# Patient Record
(demographics unavailable — no encounter records)

---

## 2025-05-02 NOTE — HISTORY OF PRESENT ILLNESS
[FreeTextEntry1] : MICK [de-identified] : The patient is a 76 year old F who presents to the office for an annual wellness examination and follow up of chronic medical conditions. She reports compliance with all prescribed medical therapy and dietary restrictions.  Routine Health maintenance (as applicable) Mammogram: 5/2024 Pap Smear: DEXA (65+): 2021; osteopenia  Colonoscopy (45+): 2023   COVID vaccine series: Flu: 2024 Tdap (19-64): 2015 Shingles (50+, immunocompetent): 2009 PCV (>66yo/other conditions): 2020

## 2025-05-02 NOTE — COUNSELING
[Fall prevention counseling provided] : Fall prevention counseling provided [Yes] : Risk of tobacco use and health benefits of smoking cessation discussed: Yes [No] : Not willing to quit smoking [AUDIT-C Screening administered and reviewed] : AUDIT-C Screening administered and reviewed [Potential consequences of obesity discussed] : Potential consequences of obesity discussed [Benefits of weight loss discussed] : Benefits of weight loss discussed [Encouraged to increase physical activity] : Encouraged to increase physical activity [Cessation strategies including cessation program discussed] : Cessation strategies including cessation program discussed [Quit Drinking] : Quit Drinking [Good understanding] : Patient has a good understanding of lifestyle changes and steps needed to achieve self management goal

## 2025-05-02 NOTE — ASSESSMENT
[FreeTextEntry1] : The patient is a 76 year old F who presents to the office for an annual wellness examination - Fasting labs to screen for anemia, electrolyte disturbances, DM, lipid disorders, and additional metabolic disorders drawn in office - EKG reviewed - NSR - PHQ2 performed: negative - Age-appropriate Immunizations recommended  - Encouraged routine dental, vision, dermatology screenings & age-appropriate physical activity - Colonoscopy screening: UTD - Mammogram: now due  - Pap smear: UTD - DEXA: now due     Where applicable: - Labs drawn in office. - Appropriate medication renewal(s) provided. - Provided scripts for necessary imaging and/or referrals.     Further management to be completed pending lab results and/or imaging studies. All of the patient's questions and concerns were answered in detail.      [Vaccines Reviewed] : Immunizations reviewed today. Please see immunization details in the vaccine log within the immunization flowsheet.

## 2025-05-02 NOTE — HEALTH RISK ASSESSMENT
[Good] : ~his/her~ current health as good [Fair] :  ~his/her~ mood as fair [Yes] : Yes [4 or more  times a week (4 pts)] : 4 or more  times a week (4 points) [3 or 4 (1 pt)] : 3 or 4  (1 point) [Never (0 pts)] : Never (0 points) [No] : In the past 12 months have you used drugs other than those required for medical reasons? No [No falls in past year] : Patient reported no falls in the past year [3] : 2) Feeling down, depressed, or hopeless for nearly every day (3) [Current] : Current [Alone] : lives alone [Fully functional (bathing, dressing, toileting, transferring, walking, feeding)] : Fully functional (bathing, dressing, toileting, transferring, walking, feeding) [Fully functional (using the telephone, shopping, preparing meals, housekeeping, doing laundry, using] : Fully functional and needs no help or supervision to perform IADLs (using the telephone, shopping, preparing meals, housekeeping, doing laundry, using transportation, managing medications and managing finances) [Reports changes in hearing] : Reports changes in hearing [Reports changes in dental health] : Reports changes in dental health [Smoke Detector] : smoke detector [Carbon Monoxide Detector] : carbon monoxide detector [Safety elements used in home] : safety elements used in home [Seat Belt] :  uses seat belt [PHQ-2 Positive] : PHQ-2 Positive [20 or more] : 20 or more [FreeTextEntry1] : skin condition [PHQ-9 Positive] : PHQ-9 Positive [I have developed a follow-up plan documented below in the note.] : I have developed a follow-up plan documented below in the note. [Time Spent: ___ Minutes] : I spent [unfilled] minutes performing a depression screening for this patient. [Audit-CScore] : 4 [de-identified] : exercised [de-identified] : healthy [YUT0Fblkc] : 3 [LowDoseCTScan] : 5/2024 [Reports changes in vision] : Reports no changes in vision [MammogramDate] : 2024 [BoneDensityDate] : 2024 [ColonoscopyDate] : 2023

## 2025-07-07 NOTE — HEALTH RISK ASSESSMENT
[Yes] : Yes [4 or more  times a week (4 pts)] : 4 or more  times a week (4 points) [1 or 2 (0 pts)] : 1 or 2 (0 points) [Never (0 pts)] : Never (0 points) [No] : In the past 12 months have you used drugs other than those required for medical reasons? No [No falls in past year] : Patient reported no falls in the past year [0] : 2) Feeling down, depressed, or hopeless: Not at all (0) [Current] : Current [de-identified] : No [de-identified] : [facility] radiologist mammogram and Ct xray  [Audit-CScore] : 4 [de-identified] : Exercising Three times a week  [de-identified] : Healthy

## 2025-07-07 NOTE — HEALTH RISK ASSESSMENT
[Yes] : Yes [4 or more  times a week (4 pts)] : 4 or more  times a week (4 points) [1 or 2 (0 pts)] : 1 or 2 (0 points) [Never (0 pts)] : Never (0 points) [No] : In the past 12 months have you used drugs other than those required for medical reasons? No [No falls in past year] : Patient reported no falls in the past year [0] : 2) Feeling down, depressed, or hopeless: Not at all (0) [Current] : Current [de-identified] : No [de-identified] : [facility] radiologist mammogram and Ct xray  [Audit-CScore] : 4 [de-identified] : Exercising Three times a week  [de-identified] : Healthy

## 2025-07-07 NOTE — HISTORY OF PRESENT ILLNESS
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

## 2025-07-07 NOTE — HISTORY OF PRESENT ILLNESS
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

## 2025-07-08 NOTE — HISTORY OF PRESENT ILLNESS
[FreeTextEntry1] : Ms. RIOS is a 77 year old female, former smoker, past medical history includes COPD, HTN, HLD, rheumatoid arthritis, right bundle branch block, and lung nodules, prurigo nodularis. A repeat scan was obtained after quitting cigarettes and there was a lung nodule identified, patient was initially seen on 05/05/2022.   CT Scan of the chest from 4/20/22 [facility]  - Focal area of consolidation or atelectasis in the posterior aspect and inferior aspect of the right middle lobe with air bronchospasms. Infectious versus inflammatory etiology is considered. Underlying malignancy cannot be ruled out.  - Bilateral emphysematous changes and suggestion of pulmonary fibrosis with honeycombing as described in the upper and lower lobes.  - Non calcified mass in the superior anterior mediastinum is grossly unchanged in size from 2014 when similarly measured.   PET/CT on 5/24/22: - There is focal subsegmental atelectasis in the right middle lobe without FDG uptake to indicate an obstructing mass lesion. The atelectasis may be secondary to asymmetric elevation of the right hemidiaphragm, which is new compared 7/27/2014. Recommend short interval follow-up with chest CT. If images of prior CT chest are made available, direct comparison will be made and an addendum issued. - No change in a 3.8 cm photopenic lobulated lesion in the superior mediastinum compared to 7/27/2014. - Slight interval progression of paraseptal emphysema. - Non-FDG-avid severe L1 compression fracture and mild compression deformity of T10 vertebral body, new since 2014. DEXA is recommended for further evaluation. - Periarticular FDG uptake of the bilateral glenohumeral joints which may be related to rheumatoid arthritis. - No evidence of FDG-avid disease.  Now s/p Flex Bronch on 5/31/22. Path of RML BAL negative for malignancy. RML bronchial brush negative for malignancy.  CT Chest on 7/21/22 @ [facility]: - paraseptal and centrilobular emphysema - stable 5mm noncalcified subpleural lung nodule along the posteromedial pleural surface of the LLL - ill-defined infiltrate w/in the RLL, nonspecific but could reflect PNA  CT Chest on 10/18/22 at [facility]: - a density, w/ air bronchograms, involving a portion of the RML contiguous w/ the inferior border of the minor fissure, gradually increased from 2020, 2021 and 4/2022 - posterior bibasilar groundglass pattern greater on the Rt side, also gradually increased - moderate bilateral subpleural and parenchymal cystic changes in both upper and lower lobes - stable 4cm anterior superior mediastinal mass since 2014  CT chest on 04/17/2023: - Focal opacity with air bronchograms seen in the RML with a nodular component. Noncalcified. Attached to the anterior diaphragm right. Measuring 29 x 28 x 15 mm in size. Slowly increased size compared with prior studies including 2020 - mix interstitial and groundglass infiltrated is seen in the posterior right lung base. And the posterior left lung base. Grossly stable compared with prior studies including 2020. But not present 2015 - opacity with air bronchograms seen in the posterior left lingula medial aspect. Grossly stable from recent prior studies 2020. - noncalclfied mass in the anterior superior mediastinum posterior to the manubrium measuring 58 x 20 x 35 in size. Anterior to the aortic arch vessels. Slowly increasing in size compared with prior studies dating back to 2015 and 2016. Nonenhancing on the 2015 study. 40 HU density - Emphysema - Coronary artery calcifications including the LAD.   CT chest on 06/26/2025: ([facility]) - anterior mediastinal mass, suggestive of thymoma - emphysema - irregular curvilinear strands RML. - bilateral interstitial pattern at the dependent lung bases - there has not been demonstratable progression of disease as compared to multiple CT scans dating back to 2019.  Patient is here today for a follow up. c/o right sided chest pain.

## 2025-07-08 NOTE — CONSULT LETTER
[Consult Letter:] : I had the pleasure of evaluating your patient, [unfilled]. [( Thank you for referring [unfilled] for consultation for _____ )] : Thank you for referring [unfilled] for consultation for [unfilled] [Please see my note below.] : Please see my note below. [Consult Closing:] : Thank you very much for allowing me to participate in the care of this patient.  If you have any questions, please do not hesitate to contact me. [Sincerely,] : Sincerely, [FreeTextEntry2] : Dr. Larson (PCP/Referring)\par  Calixto Glover MD (Pulmonologist) [FreeTextEntry3] : Wild Hill MD\par  Director of Thoracic, [facility]\par  Cardiovascular & Thoracic Surgery\par  \par  [facility]\par  [address]\par  Oncology Building 3rd Fl\[place] [address]\par  Tel: [phone]\par  Fax: [phone]\par

## 2025-07-08 NOTE — ASSESSMENT
[FreeTextEntry1] : Ms. RIOS is a 77 year old female, former smoker, past medical history includes COPD, HTN, HLD, rheumatoid arthritis, right bundle branch block, and lung nodules. A repeat scan was obtained after quitting cigarettes and there was a lung nodule identified, patient was initially seen on 05/05/2022.   Now s/p Flex Bronch on 5/31/22. Path of RML BAL negative for malignancy. RML bronchial brush negative for malignancy.  CT chest on 04/17/2023: - Focal opacity with air bronchograms seen in the RML with a nodular component. Noncalcified. Attached to the anterior diaphragm right. Measuring 29 x 28 x 15 mm in size. Slowly increased size compared with prior studies including 2020 - mix interstitial and groundglass infiltrated is seen in the posterior right lung base. And the posterior left lung base. Grossly stable compared with prior studies including 2020. But not present 2015 - opacity with air bronchograms seen in the posterior left lingula medial aspect. Grossly stable from recent prior studies 2020. - noncalclfied mass in the anterior superior mediastinum posterior to the manubrium measuring 58 x 20 x 35 in size. Anterior to the aortic arch vessels. Slowly increasing in size compared with prior studies dating back to 2015 and 2016. Nonenhancing on the 2015 study. 40 HU density - Emphysema - Coronary artery calcifications including the LAD.   I have reviewed the patient's medical records and diagnostic images at time of this office consultation and have made the following recommendation: 1. CT chest report reviewed, no imagines are available to review during office visit. [facility] is closed no access to imagines. Patient is very anxious about the new anterior mediastinal mass finding. Will repeat CT chest w/o contrast now and RTC in 2 weeks to discuss plan of care.   I, ANTONIETTA Rodríguez, personally performed the evaluation and management (E/M) services for this established patient who follow up today with an existing condition.  That E/M includes conducting the examination, assessing all new/exacerbated/existing conditions, and establishing a plan of care.  Today, my ACP, FORREST BermudezC, was here to observe my evaluation and management services for this existing condition to be followed going forward.

## 2025-07-22 NOTE — CONSULT LETTER
[Consult Letter:] : I had the pleasure of evaluating your patient, [unfilled]. [( Thank you for referring [unfilled] for consultation for _____ )] : Thank you for referring [unfilled] for consultation for [unfilled] [Please see my note below.] : Please see my note below. [Consult Closing:] : Thank you very much for allowing me to participate in the care of this patient.  If you have any questions, please do not hesitate to contact me. [Sincerely,] : Sincerely, [FreeTextEntry2] : Dr. Larson (PCP/Referring)\par  Calixto Glover MD (Pulmonologist) [FreeTextEntry3] : Wild Hill MD\par  Director of Thoracic, [facility]\par  Cardiovascular & Thoracic Surgery\par  \par  [facility]\par  [address]\par  Oncology Building 3rd Fl\[place] [address]\par  Tel: [phone]\par  Fax: [phone]\par

## 2025-07-22 NOTE — HISTORY OF PRESENT ILLNESS
[FreeTextEntry1] : Ms. RIOS is a 77 year old female, former smoker, past medical history includes COPD, HTN, HLD, rheumatoid arthritis, right bundle branch block, and lung nodules, prurigo nodularis. A repeat scan was obtained after quitting cigarettes and there was a lung nodule identified, patient was initially seen on 05/05/2022.   CT Scan of the chest from 4/20/22 [facility]  - Focal area of consolidation or atelectasis in the posterior aspect and inferior aspect of the right middle lobe with air bronchospasms. Infectious versus inflammatory etiology is considered. Underlying malignancy cannot be ruled out.  - Bilateral emphysematous changes and suggestion of pulmonary fibrosis with honeycombing as described in the upper and lower lobes.  - Non calcified mass in the superior anterior mediastinum is grossly unchanged in size from 2014 when similarly measured.   PET/CT on 5/24/22: - There is focal subsegmental atelectasis in the right middle lobe without FDG uptake to indicate an obstructing mass lesion. The atelectasis may be secondary to asymmetric elevation of the right hemidiaphragm, which is new compared 7/27/2014. Recommend short interval follow-up with chest CT. If images of prior CT chest are made available, direct comparison will be made and an addendum issued. - No change in a 3.8 cm photopenic lobulated lesion in the superior mediastinum compared to 7/27/2014. - Slight interval progression of paraseptal emphysema. - Non-FDG-avid severe L1 compression fracture and mild compression deformity of T10 vertebral body, new since 2014. DEXA is recommended for further evaluation. - Periarticular FDG uptake of the bilateral glenohumeral joints which may be related to rheumatoid arthritis. - No evidence of FDG-avid disease.  Now s/p Flex Bronch on 5/31/22. Path of RML BAL negative for malignancy. RML bronchial brush negative for malignancy.  CT Chest on 7/21/22 @ [facility]: - paraseptal and centrilobular emphysema - stable 5mm noncalcified subpleural lung nodule along the posteromedial pleural surface of the LLL - ill-defined infiltrate w/in the RLL, nonspecific but could reflect PNA  CT Chest on 10/18/22 at [facility]: - a density, w/ air bronchograms, involving a portion of the RML contiguous w/ the inferior border of the minor fissure, gradually increased from 2020, 2021 and 4/2022 - posterior bibasilar groundglass pattern greater on the Rt side, also gradually increased - moderate bilateral subpleural and parenchymal cystic changes in both upper and lower lobes - stable 4cm anterior superior mediastinal mass since 2014  CT chest on 04/17/2023: - Focal opacity with air bronchograms seen in the RML with a nodular component. Noncalcified. Attached to the anterior diaphragm right. Measuring 29 x 28 x 15 mm in size. Slowly increased size compared with prior studies including 2020 - mix interstitial and groundglass infiltrated is seen in the posterior right lung base. And the posterior left lung base. Grossly stable compared with prior studies including 2020. But not present 2015 - opacity with air bronchograms seen in the posterior left lingula medial aspect. Grossly stable from recent prior studies 2020. - noncalclfied mass in the anterior superior mediastinum posterior to the manubrium measuring 58 x 20 x 35 in size. Anterior to the aortic arch vessels. Slowly increasing in size compared with prior studies dating back to 2015 and 2016. Nonenhancing on the 2015 study. 40 HU density - Emphysema - Coronary artery calcifications including the LAD.   CT chest on 06/26/2025: ([facility]) - anterior mediastinal mass, suggestive of thymoma - emphysema - irregular curvilinear strands RML. - bilateral interstitial pattern at the dependent lung bases - there has not been demonstratable progression of disease as compared to multiple CT scans dating back to 2019.  Last seen on 7/8/25: C/o right sided chest pain. CT chest report reviewed, no imagines are available to review during office visit. [facility] is closed no access to imagines. Patient is very anxious about the new anterior mediastinal mass finding. Will repeat CT chest w/o contrast now and RTC in 2 weeks to discuss plan of care.  CT Chest on 7/15/25 ([facility]): - Scattered ill-defined foci of increased lucency identified in the lungs, predominantly in the upper lobes.  - Bullae are present in the upper lobes ranging up to 2.2 cm in diameter. 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 - Peripheral reticular densities with architectural distortion and scarring are present in the lower lobes bilaterally, worse in the right lower lobe. Associated cyst formation is noted. 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 - Hazy groundglass opacity associated with scarring in the posterior inferior right lower lobe.  - A 0.5 cm subpleural noncalcified nodule is seen laterally in the right middle lobe (image 177, series 3). - A 0.6 x 0.4 cm nodule is seen in the periphery of the left lower lobe posteriorly and medially (image 169). - A lobulated mass is seen in the anterior mediastinum measuring approximately 3.8 x 1.7 x 3.4 cm (image 58, series 5; image 22, series 2). The density measurement is 47 Hounsfield units, and a fairly uniform density is present. The center of the mass is above the aortic arch.  Patient is here today for a follow up. Overall, she reports to be feeling well. Denies any chest pain, shortness of breath, cough, or hemoptysis.

## 2025-07-22 NOTE — CONSULT LETTER
[Consult Letter:] : I had the pleasure of evaluating your patient, [unfilled]. [( Thank you for referring [unfilled] for consultation for _____ )] : Thank you for referring [unfilled] for consultation for [unfilled] [Please see my note below.] : Please see my note below. [Consult Closing:] : Thank you very much for allowing me to participate in the care of this patient.  If you have any questions, please do not hesitate to contact me. [Sincerely,] : Sincerely, [FreeTextEntry2] : Dr. Larson (PCP/Referring)\par  Calixto Glover MD (Pulmonologist) [FreeTextEntry3] : Wild Hill MD\par  Director of Thoracic, [facility]\par  Cardiovascular & Thoracic Surgery\par  \par  [facility]\par  [address]\par  Oncology Building 3rd Fl\[place] [address]\par  Tel: [phone]\par  Fax: [phone]\par

## 2025-07-22 NOTE — CONSULT LETTER
[Consult Letter:] : I had the pleasure of evaluating your patient, [unfilled]. [( Thank you for referring [unfilled] for consultation for _____ )] : Thank you for referring [unfilled] for consultation for [unfilled] [Please see my note below.] : Please see my note below. [Consult Closing:] : Thank you very much for allowing me to participate in the care of this patient.  If you have any questions, please do not hesitate to contact me. [Sincerely,] : Sincerely, [FreeTextEntry2] : Dr. Larson (PCP/Referring)\par  Calixto Glover MD (Pulmonologist) [FreeTextEntry3] : Wild Hill MD\par  Director of Thoracic, [facility]\par  Cardiovascular & Thoracic Surgery\par  \par  [facility]\par  [address]\par  Oncology Building 3rd Fl\[place] [address]\par  Tel: [phone]\par  Fax: [phone]\par

## 2025-07-22 NOTE — HISTORY OF PRESENT ILLNESS
[FreeTextEntry1] : Ms. RIOS is a 77 year old female, former smoker, past medical history includes COPD, HTN, HLD, rheumatoid arthritis, right bundle branch block, and lung nodules, prurigo nodularis. A repeat scan was obtained after quitting cigarettes and there was a lung nodule identified, patient was initially seen on 05/05/2022.   CT Scan of the chest from 4/20/22 [facility]  - Focal area of consolidation or atelectasis in the posterior aspect and inferior aspect of the right middle lobe with air bronchospasms. Infectious versus inflammatory etiology is considered. Underlying malignancy cannot be ruled out.  - Bilateral emphysematous changes and suggestion of pulmonary fibrosis with honeycombing as described in the upper and lower lobes.  - Non calcified mass in the superior anterior mediastinum is grossly unchanged in size from 2014 when similarly measured.   PET/CT on 5/24/22: - There is focal subsegmental atelectasis in the right middle lobe without FDG uptake to indicate an obstructing mass lesion. The atelectasis may be secondary to asymmetric elevation of the right hemidiaphragm, which is new compared 7/27/2014. Recommend short interval follow-up with chest CT. If images of prior CT chest are made available, direct comparison will be made and an addendum issued. - No change in a 3.8 cm photopenic lobulated lesion in the superior mediastinum compared to 7/27/2014. - Slight interval progression of paraseptal emphysema. - Non-FDG-avid severe L1 compression fracture and mild compression deformity of T10 vertebral body, new since 2014. DEXA is recommended for further evaluation. - Periarticular FDG uptake of the bilateral glenohumeral joints which may be related to rheumatoid arthritis. - No evidence of FDG-avid disease.  Now s/p Flex Bronch on 5/31/22. Path of RML BAL negative for malignancy. RML bronchial brush negative for malignancy.  CT Chest on 7/21/22 @ [facility]: - paraseptal and centrilobular emphysema - stable 5mm noncalcified subpleural lung nodule along the posteromedial pleural surface of the LLL - ill-defined infiltrate w/in the RLL, nonspecific but could reflect PNA  CT Chest on 10/18/22 at [facility]: - a density, w/ air bronchograms, involving a portion of the RML contiguous w/ the inferior border of the minor fissure, gradually increased from 2020, 2021 and 4/2022 - posterior bibasilar groundglass pattern greater on the Rt side, also gradually increased - moderate bilateral subpleural and parenchymal cystic changes in both upper and lower lobes - stable 4cm anterior superior mediastinal mass since 2014  CT chest on 04/17/2023: - Focal opacity with air bronchograms seen in the RML with a nodular component. Noncalcified. Attached to the anterior diaphragm right. Measuring 29 x 28 x 15 mm in size. Slowly increased size compared with prior studies including 2020 - mix interstitial and groundglass infiltrated is seen in the posterior right lung base. And the posterior left lung base. Grossly stable compared with prior studies including 2020. But not present 2015 - opacity with air bronchograms seen in the posterior left lingula medial aspect. Grossly stable from recent prior studies 2020. - noncalclfied mass in the anterior superior mediastinum posterior to the manubrium measuring 58 x 20 x 35 in size. Anterior to the aortic arch vessels. Slowly increasing in size compared with prior studies dating back to 2015 and 2016. Nonenhancing on the 2015 study. 40 HU density - Emphysema - Coronary artery calcifications including the LAD.   CT chest on 06/26/2025: ([facility]) - anterior mediastinal mass, suggestive of thymoma - emphysema - irregular curvilinear strands RML. - bilateral interstitial pattern at the dependent lung bases - there has not been demonstratable progression of disease as compared to multiple CT scans dating back to 2019.  Last seen on 7/8/25: C/o right sided chest pain. CT chest report reviewed, no imagines are available to review during office visit. [facility] is closed no access to imagines. Patient is very anxious about the new anterior mediastinal mass finding. Will repeat CT chest w/o contrast now and RTC in 2 weeks to discuss plan of care.  CT Chest on 7/15/25 ([facility]): - Scattered ill-defined foci of increased lucency identified in the lungs, predominantly in the upper lobes.  - Bullae are present in the upper lobes ranging up to 2.2 cm in diameter. 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 - Peripheral reticular densities with architectural distortion and scarring are present in the lower lobes bilaterally, worse in the right lower lobe. Associated cyst formation is noted. 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 - Hazy groundglass opacity associated with scarring in the posterior inferior right lower lobe.  - A 0.5 cm subpleural noncalcified nodule is seen laterally in the right middle lobe (image 177, series 3). - A 0.6 x 0.4 cm nodule is seen in the periphery of the left lower lobe posteriorly and medially (image 169). - A lobulated mass is seen in the anterior mediastinum measuring approximately 3.8 x 1.7 x 3.4 cm (image 58, series 5; image 22, series 2). The density measurement is 47 Hounsfield units, and a fairly uniform density is present. The center of the mass is above the aortic arch.  Patient is here today for a follow up. Overall, she reports to be feeling well. Denies any chest pain, shortness of breath, cough, or hemoptysis.

## 2025-07-22 NOTE — ASSESSMENT
[FreeTextEntry1] : Ms. RIOS is a 77 year old female, former smoker, past medical history includes COPD, HTN, HLD, rheumatoid arthritis, right bundle branch block, and lung nodules. A repeat scan was obtained after quitting cigarettes and there was a lung nodule identified, patient was initially seen on 05/05/2022.   Now s/p Flex Bronch on 5/31/22. Path of RML BAL negative for malignancy. RML bronchial brush negative for malignancy.  Last seen on 7/8/25: C/o right sided chest pain. CT chest report reviewed, no imagines are available to review during office visit. [facility] is closed no access to imagines. Patient is very anxious about the new anterior mediastinal mass finding. Will repeat CT chest w/o contrast now and RTC in 2 weeks to discuss plan of care.  Presents today for follow up with imaging.   I have reviewed the patient's medical records and diagnostic images at time of this office consultation and have made the following recommendation: - CT Chest reviewed with patient, revealing multiple lung nodules and anterior mediastinum mass of unclear etiology. Discussed surgical intervention for diagnostic and therapeutic purposes vs surveillance. Patient opts for surveillance at this time. Will obtain CT Chest without contrast in 3 months for re-evaluation. She is agreeable.   Recommendations reviewed with patient during this office visit, and all questions answered; Patient instructed on the importance of follow up and verbalizes understanding.    I, ANTONIETTA Rodríguez, personally performed the evaluation and management (E/M) services for this established patient. That E/M includes conducting the examination, assessing all new/exacerbated conditions, and establishing a new plan of care. Today, my ACP, Enoch Brewster NP, was here to observe my evaluation and management services for this new problem/exacerbated condition to be followed going forward.

## 2025-07-22 NOTE — ASSESSMENT
[FreeTextEntry1] : Ms. RIOS is a 77 year old female, former smoker, past medical history includes COPD, HTN, HLD, rheumatoid arthritis, right bundle branch block, and lung nodules. A repeat scan was obtained after quitting cigarettes and there was a lung nodule identified, patient was initially seen on 05/05/2022.   Now s/p Flex Bronch on 5/31/22. Path of RML BAL negative for malignancy. RML bronchial brush negative for malignancy.  Last seen on 7/8/25: C/o right sided chest pain. CT chest report reviewed, no imagines are available to review during office visit. [facility] is closed no access to imagines. Patient is very anxious about the new anterior mediastinal mass finding. Will repeat CT chest w/o contrast now and RTC in 2 weeks to discuss plan of care.  Presents today for follow up with imaging.   I have reviewed the patient's medical records and diagnostic images at time of this office consultation and have made the following recommendation: - CT Chest reviewed with patient, revealing multiple lung nodules and anterior mediastinum mass of unclear etiology. Discussed surgical intervention for diagnostic and therapeutic purposes vs surveillance. Patient opts for surveillance at this time. Will obtain CT Chest without contrast in 3 months for re-evaluation. She is agreeable.   Recommendations reviewed with patient during this office visit, and all questions answered; Patient instructed on the importance of follow up and verbalizes understanding.    I, ANTONIETTA Rodríguez, personally performed the evaluation and management (E/M) services for this established patient. That E/M includes conducting the examination, assessing all new/exacerbated conditions, and establishing a new plan of care. Today, my ACP, Enoch Brewster NP, was here to observe my evaluation and management services for this new problem/exacerbated condition to be followed going forward.

## 2025-07-22 NOTE — ASSESSMENT
[FreeTextEntry1] : Ms. RIOS is a 77 year old female, former smoker, past medical history includes COPD, HTN, HLD, rheumatoid arthritis, right bundle branch block, and lung nodules. A repeat scan was obtained after quitting cigarettes and there was a lung nodule identified, patient was initially seen on 05/05/2022.   Now s/p Flex Bronch on 5/31/22. Path of RML BAL negative for malignancy. RML bronchial brush negative for malignancy.  Last seen on 7/8/25: C/o right sided chest pain. CT chest report reviewed, no imagines are available to review during office visit. [facility] is closed no access to imagines. Patient is very anxious about the new anterior mediastinal mass finding. Will repeat CT chest w/o contrast now and RTC in 2 weeks to discuss plan of care.  Presents today for follow up with imaging.   I have reviewed the patient's medical records and diagnostic images at time of this office consultation and have made the following recommendation: - CT Chest reviewed with patient, revealing multiple lung nodules and anterior mediastinum mass of unclear etiology. Discussed surgical intervention for diagnostic and therapeutic purposes vs surveillance. Patient opts for surveillance at this time. Will obtain CT Chest without contrast in 3 months for re-evaluation. She is agreeable.   Recommendations reviewed with patient during this office visit, and all questions answered; Patient instructed on the importance of follow up and verbalizes understanding.    I, ANTONIETTA Rodríguez, personally performed the evaluation and management (E/M) services for this established patient. That E/M includes conducting the examination, assessing all new/exacerbated conditions, and establishing a new plan of care. Today, my ACP, Enoch Brewster NP, was here to observe my evaluation and management services for this new problem/exacerbated condition to be followed going forward.

## 2025-07-22 NOTE — PHYSICAL EXAM
[] : no respiratory distress [Auscultation Breath Sounds / Voice Sounds] : lungs were clear to auscultation bilaterally [Heart Rate And Rhythm] : heart rate was normal and rhythm regular [Heart Sounds] : normal S1 and S2 [Heart Sounds Gallop] : no gallops [Murmurs] : no murmurs [Heart Sounds Pericardial Friction Rub] : no pericardial rub [Examination Of The Chest] : the chest was normal in appearance [Chest Visual Inspection Thoracic Asymmetry] : no chest asymmetry [Diminished Respiratory Excursion] : normal chest expansion [2+] : left 2+ [No Abnormalities] : the abdominal aorta was not enlarged and no bruit was heard [Right Carotid Bruit] : no bruit heard over the right carotid [Left Carotid Bruit] : no bruit heard over the left carotid [Right Femoral Bruit] : no bruit heard over the right femoral artery [Left Femoral Bruit] : no bruit heard over the left femoral artery

## 2025-07-22 NOTE — HISTORY OF PRESENT ILLNESS
negative [FreeTextEntry1] : Ms. RIOS is a 77 year old female, former smoker, past medical history includes COPD, HTN, HLD, rheumatoid arthritis, right bundle branch block, and lung nodules, prurigo nodularis. A repeat scan was obtained after quitting cigarettes and there was a lung nodule identified, patient was initially seen on 05/05/2022.   CT Scan of the chest from 4/20/22 [facility]  - Focal area of consolidation or atelectasis in the posterior aspect and inferior aspect of the right middle lobe with air bronchospasms. Infectious versus inflammatory etiology is considered. Underlying malignancy cannot be ruled out.  - Bilateral emphysematous changes and suggestion of pulmonary fibrosis with honeycombing as described in the upper and lower lobes.  - Non calcified mass in the superior anterior mediastinum is grossly unchanged in size from 2014 when similarly measured.   PET/CT on 5/24/22: - There is focal subsegmental atelectasis in the right middle lobe without FDG uptake to indicate an obstructing mass lesion. The atelectasis may be secondary to asymmetric elevation of the right hemidiaphragm, which is new compared 7/27/2014. Recommend short interval follow-up with chest CT. If images of prior CT chest are made available, direct comparison will be made and an addendum issued. - No change in a 3.8 cm photopenic lobulated lesion in the superior mediastinum compared to 7/27/2014. - Slight interval progression of paraseptal emphysema. - Non-FDG-avid severe L1 compression fracture and mild compression deformity of T10 vertebral body, new since 2014. DEXA is recommended for further evaluation. - Periarticular FDG uptake of the bilateral glenohumeral joints which may be related to rheumatoid arthritis. - No evidence of FDG-avid disease.  Now s/p Flex Bronch on 5/31/22. Path of RML BAL negative for malignancy. RML bronchial brush negative for malignancy.  CT Chest on 7/21/22 @ [facility]: - paraseptal and centrilobular emphysema - stable 5mm noncalcified subpleural lung nodule along the posteromedial pleural surface of the LLL - ill-defined infiltrate w/in the RLL, nonspecific but could reflect PNA  CT Chest on 10/18/22 at [facility]: - a density, w/ air bronchograms, involving a portion of the RML contiguous w/ the inferior border of the minor fissure, gradually increased from 2020, 2021 and 4/2022 - posterior bibasilar groundglass pattern greater on the Rt side, also gradually increased - moderate bilateral subpleural and parenchymal cystic changes in both upper and lower lobes - stable 4cm anterior superior mediastinal mass since 2014  CT chest on 04/17/2023: - Focal opacity with air bronchograms seen in the RML with a nodular component. Noncalcified. Attached to the anterior diaphragm right. Measuring 29 x 28 x 15 mm in size. Slowly increased size compared with prior studies including 2020 - mix interstitial and groundglass infiltrated is seen in the posterior right lung base. And the posterior left lung base. Grossly stable compared with prior studies including 2020. But not present 2015 - opacity with air bronchograms seen in the posterior left lingula medial aspect. Grossly stable from recent prior studies 2020. - noncalclfied mass in the anterior superior mediastinum posterior to the manubrium measuring 58 x 20 x 35 in size. Anterior to the aortic arch vessels. Slowly increasing in size compared with prior studies dating back to 2015 and 2016. Nonenhancing on the 2015 study. 40 HU density - Emphysema - Coronary artery calcifications including the LAD.   CT chest on 06/26/2025: ([facility]) - anterior mediastinal mass, suggestive of thymoma - emphysema - irregular curvilinear strands RML. - bilateral interstitial pattern at the dependent lung bases - there has not been demonstratable progression of disease as compared to multiple CT scans dating back to 2019.  Last seen on 7/8/25: C/o right sided chest pain. CT chest report reviewed, no imagines are available to review during office visit. [facility] is closed no access to imagines. Patient is very anxious about the new anterior mediastinal mass finding. Will repeat CT chest w/o contrast now and RTC in 2 weeks to discuss plan of care.  CT Chest on 7/15/25 ([facility]): - Scattered ill-defined foci of increased lucency identified in the lungs, predominantly in the upper lobes.  - Bullae are present in the upper lobes ranging up to 2.2 cm in diameter. 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 - Peripheral reticular densities with architectural distortion and scarring are present in the lower lobes bilaterally, worse in the right lower lobe. Associated cyst formation is noted. 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 - Hazy groundglass opacity associated with scarring in the posterior inferior right lower lobe.  - A 0.5 cm subpleural noncalcified nodule is seen laterally in the right middle lobe (image 177, series 3). - A 0.6 x 0.4 cm nodule is seen in the periphery of the left lower lobe posteriorly and medially (image 169). - A lobulated mass is seen in the anterior mediastinum measuring approximately 3.8 x 1.7 x 3.4 cm (image 58, series 5; image 22, series 2). The density measurement is 47 Hounsfield units, and a fairly uniform density is present. The center of the mass is above the aortic arch.  Patient is here today for a follow up. Overall, she reports to be feeling well. Denies any chest pain, shortness of breath, cough, or hemoptysis.  No oral lesions; no gross abnormalities